# Patient Record
Sex: MALE | Race: BLACK OR AFRICAN AMERICAN | URBAN - METROPOLITAN AREA
[De-identification: names, ages, dates, MRNs, and addresses within clinical notes are randomized per-mention and may not be internally consistent; named-entity substitution may affect disease eponyms.]

---
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
## 2024-01-03 ENCOUNTER — APPOINTMENT (OUTPATIENT)
Dept: PRIMARY CARE | Facility: CLINIC | Age: 19
End: 2024-01-03
Payer: COMMERCIAL

## 2024-01-10 ENCOUNTER — OFFICE VISIT (OUTPATIENT)
Dept: PRIMARY CARE | Facility: CLINIC | Age: 19
End: 2024-01-10
Payer: COMMERCIAL

## 2024-01-10 VITALS
DIASTOLIC BLOOD PRESSURE: 76 MMHG | WEIGHT: 162 LBS | HEIGHT: 73 IN | BODY MASS INDEX: 21.47 KG/M2 | SYSTOLIC BLOOD PRESSURE: 117 MMHG | HEART RATE: 91 BPM

## 2024-01-10 DIAGNOSIS — G89.29 CHRONIC PAIN OF RIGHT KNEE: ICD-10-CM

## 2024-01-10 DIAGNOSIS — Z00.00 ANNUAL PHYSICAL EXAM: Primary | ICD-10-CM

## 2024-01-10 DIAGNOSIS — R11.15 EMESIS, PERSISTENT: ICD-10-CM

## 2024-01-10 DIAGNOSIS — M25.561 CHRONIC PAIN OF RIGHT KNEE: ICD-10-CM

## 2024-01-10 LAB
25(OH)D3 SERPL-MCNC: 19 NG/ML (ref 30–100)
ALBUMIN SERPL BCP-MCNC: 4.9 G/DL (ref 3.4–5)
ALP SERPL-CCNC: 59 U/L (ref 33–120)
ALT SERPL W P-5'-P-CCNC: 12 U/L (ref 10–52)
ANION GAP SERPL CALC-SCNC: 12 MMOL/L (ref 10–20)
AST SERPL W P-5'-P-CCNC: 16 U/L (ref 9–39)
BASOPHILS # BLD AUTO: 0.02 X10*3/UL (ref 0–0.1)
BASOPHILS NFR BLD AUTO: 0.8 %
BILIRUB SERPL-MCNC: 0.5 MG/DL (ref 0–1.2)
BUN SERPL-MCNC: 17 MG/DL (ref 6–23)
CALCIUM SERPL-MCNC: 9.8 MG/DL (ref 8.6–10.6)
CHLORIDE SERPL-SCNC: 106 MMOL/L (ref 98–107)
CO2 SERPL-SCNC: 28 MMOL/L (ref 21–32)
CREAT SERPL-MCNC: 1.02 MG/DL (ref 0.5–1.3)
EGFRCR SERPLBLD CKD-EPI 2021: >90 ML/MIN/1.73M*2
EOSINOPHIL # BLD AUTO: 0.02 X10*3/UL (ref 0–0.7)
EOSINOPHIL NFR BLD AUTO: 0.8 %
ERYTHROCYTE [DISTWIDTH] IN BLOOD BY AUTOMATED COUNT: 15.6 % (ref 11.5–14.5)
GLUCOSE SERPL-MCNC: 84 MG/DL (ref 74–99)
HCT VFR BLD AUTO: 41 % (ref 41–52)
HGB BLD-MCNC: 13.2 G/DL (ref 13.5–17.5)
IMM GRANULOCYTES # BLD AUTO: 0 X10*3/UL (ref 0–0.7)
IMM GRANULOCYTES NFR BLD AUTO: 0 % (ref 0–0.9)
LYMPHOCYTES # BLD AUTO: 1.01 X10*3/UL (ref 1.2–4.8)
LYMPHOCYTES NFR BLD AUTO: 40.7 %
MCH RBC QN AUTO: 28.4 PG (ref 26–34)
MCHC RBC AUTO-ENTMCNC: 32.2 G/DL (ref 32–36)
MCV RBC AUTO: 88 FL (ref 80–100)
MONOCYTES # BLD AUTO: 0.24 X10*3/UL (ref 0.1–1)
MONOCYTES NFR BLD AUTO: 9.7 %
NEUTROPHILS # BLD AUTO: 1.19 X10*3/UL (ref 1.2–7.7)
NEUTROPHILS NFR BLD AUTO: 48 %
NRBC BLD-RTO: 0 /100 WBCS (ref 0–0)
PLATELET # BLD AUTO: 224 X10*3/UL (ref 150–450)
POTASSIUM SERPL-SCNC: 4.2 MMOL/L (ref 3.5–5.3)
PROT SERPL-MCNC: 7.4 G/DL (ref 6.4–8.2)
RBC # BLD AUTO: 4.64 X10*6/UL (ref 4.5–5.9)
SODIUM SERPL-SCNC: 142 MMOL/L (ref 136–145)
TSH SERPL-ACNC: 0.86 MIU/L (ref 0.44–3.98)
WBC # BLD AUTO: 2.5 X10*3/UL (ref 4.4–11.3)

## 2024-01-10 PROCEDURE — 99385 PREV VISIT NEW AGE 18-39: CPT | Performed by: NURSE PRACTITIONER

## 2024-01-10 PROCEDURE — 36415 COLL VENOUS BLD VENIPUNCTURE: CPT

## 2024-01-10 PROCEDURE — 80053 COMPREHEN METABOLIC PANEL: CPT

## 2024-01-10 PROCEDURE — 85025 COMPLETE CBC W/AUTO DIFF WBC: CPT

## 2024-01-10 PROCEDURE — 99203 OFFICE O/P NEW LOW 30 MIN: CPT | Performed by: NURSE PRACTITIONER

## 2024-01-10 PROCEDURE — 1036F TOBACCO NON-USER: CPT | Performed by: NURSE PRACTITIONER

## 2024-01-10 PROCEDURE — 82306 VITAMIN D 25 HYDROXY: CPT

## 2024-01-10 PROCEDURE — 84443 ASSAY THYROID STIM HORMONE: CPT

## 2024-01-10 RX ORDER — IBUPROFEN 800 MG/1
800 TABLET ORAL 3 TIMES DAILY PRN
Qty: 30 TABLET | Refills: 0 | Status: SHIPPED | OUTPATIENT
Start: 2024-01-10 | End: 2024-03-19 | Stop reason: SDUPTHER

## 2024-01-10 RX ORDER — FAMOTIDINE 20 MG/1
20 TABLET, FILM COATED ORAL DAILY
Qty: 30 TABLET | Refills: 1 | Status: SHIPPED | OUTPATIENT
Start: 2024-01-10 | End: 2024-03-19 | Stop reason: SDUPTHER

## 2024-01-10 ASSESSMENT — ENCOUNTER SYMPTOMS
OCCASIONAL FEELINGS OF UNSTEADINESS: 0
DEPRESSION: 0
LOSS OF SENSATION IN FEET: 0

## 2024-01-10 ASSESSMENT — PATIENT HEALTH QUESTIONNAIRE - PHQ9
1. LITTLE INTEREST OR PLEASURE IN DOING THINGS: NOT AT ALL
2. FEELING DOWN, DEPRESSED OR HOPELESS: NOT AT ALL
SUM OF ALL RESPONSES TO PHQ9 QUESTIONS 1 AND 2: 0

## 2024-01-10 NOTE — PROGRESS NOTES
"Reason for Visit: Annual Physical Exam    HPI: Randy is a 18-year-old male who presents to the office today, accompanied by his girlfriend, as a new patient, for physical exam.  He reported that he has been having right knee pain.  Stated knee pain has been \"off and on \"for the past couple of years.  He admitted that he did play many no past surgical sports throughout high school.  He stated that he did sustain multiple sprains including knee ankle and wrist while in high school.  He is not currently taking any over-the-counter pain medications.    Stated he doesn't eat much. Reported he vomits after eating or drinking liquids. Denied hemoptysis or acid reflux. No abdominal pain, diarrhea or constipation. Denied urinary symptoms.    No past surgical or medical history.  He is not currently on any scheduled medications.  No known drug allergies.    He lives with his girlfriend.  He does not have any children.  He works as a home health aide.  Reported that he has smoked marijuana in the past.  Does drink alcohol occasionally.    Maternal grandmother with history of breast and bone cancer.    Active Problem List  Patient Active Problem List   Diagnosis   (none) - all problems resolved or deleted       Comprehensive Medical/Surgical/Social/Family History  Past Medical History:   Diagnosis Date    Contusion of right knee 12/26/2023    Fever 08/15/2010    Formatting of this note might be different from the original. Fever with significant HA and recent cough- feeling much better this morning after LP,  IVF and CTX- Physical exam is nonfocal and labs are unrevealing.  Clinical picture most c/w viral illness.  If po intake is good today- consider dc later today or in the morning.    Other conditions influencing health status 08/11/2021    No significant past medical history     Past Surgical History:   Procedure Laterality Date    OTHER SURGICAL HISTORY  08/11/2021    No history of surgery     Social History     Social " "History Narrative    Not on file         Allergies and Medications  Patient has no known allergies.  Current Outpatient Medications on File Prior to Visit   Medication Sig Dispense Refill    [DISCONTINUED] famotidine (Pepcid) 20 mg tablet Take 1 tablet (20 mg) by mouth once daily for 7 days. 7 tablet 0     No current facility-administered medications on file prior to visit.     ROS otherwise negative aside from what was mentioned above in HPI.    Vitals  /76   Pulse 91   Ht 1.854 m (6' 1\")   Wt 73.5 kg (162 lb)   BMI 21.37 kg/m²   Body mass index is 21.37 kg/m².  Physical Exam  Gen: Alert, NAD  HEENT:  PERRLA, EOMI, conjunctiva and sclera normal in appearance. External auditory canals/TMs normal; Oral cavity and posterior pharynx without lesions/exudate  Neck:  Supple with FROM; No masses/nodes palpable; Thyroid nontender and without nodules; No ALO  Respiratory:  Lungs CTAB  Cardiovascular:  Heart RRR. No M/R/G. Peripheral pulses equal bilaterally  Abdomen:  Soft, nontender, BS present throughout; No R/G/R; No HSM or masses palpated  Extremities:  FROM all extremities; Muscle strength grossly normal with good tone  Neuro:  CN II-XII intact; Reflexes 2+/2+; Gross motor and sensory intact  Skin:  No suspicious lesions present  Breast: No masses, skin lesions or nipple discharges, no axillary lymphadenopathy    Assessment and Plan:  Problem List Items Addressed This Visit    None  Visit Diagnoses       Annual physical exam    -  Primary    Relevant Orders    CBC and Auto Differential    Comprehensive Metabolic Panel    TSH with reflex to Free T4 if abnormal    Vitamin D 25-Hydroxy,Total (for eval of Vitamin D levels)    Chronic pain of right knee        Relevant Medications    ibuprofen 800 mg tablet        1) Annual physical: blood work ordered.    2) Chronic pain of right knee: you were prescribed Ibuprofen. Recommend knee stretches and exercises along with heat/ice alternation when having " pain/discomfort.    3) Emesis: I offered you Ondansetron. You refused prescription. Recommend you restart Famotidine. Referral placed for gastroenterology.

## 2024-01-11 ENCOUNTER — TELEPHONE (OUTPATIENT)
Dept: PRIMARY CARE | Facility: CLINIC | Age: 19
End: 2024-01-11
Payer: COMMERCIAL

## 2024-01-11 DIAGNOSIS — E55.9 VITAMIN D DEFICIENCY: ICD-10-CM

## 2024-01-11 DIAGNOSIS — R11.15 EMESIS, PERSISTENT: ICD-10-CM

## 2024-01-11 DIAGNOSIS — R10.9 STOMACH PAIN: ICD-10-CM

## 2024-01-11 RX ORDER — ERGOCALCIFEROL 1.25 MG/1
50000 CAPSULE ORAL
Qty: 13 CAPSULE | Refills: 1 | Status: SHIPPED | OUTPATIENT
Start: 2024-01-11 | End: 2024-03-19 | Stop reason: SDUPTHER

## 2024-01-11 NOTE — TELEPHONE ENCOUNTER
----- Message from JORGE Cullen-CNP sent at 1/10/2024 11:22 PM EST -----  Jessie,    Can you have Randy come in office for h. Pylori breath test. Also, I want him to see gastroenterology for his vomiting.

## 2024-02-14 ENCOUNTER — APPOINTMENT (OUTPATIENT)
Dept: GASTROENTEROLOGY | Facility: CLINIC | Age: 19
End: 2024-02-14
Payer: COMMERCIAL

## 2024-03-19 ENCOUNTER — OFFICE VISIT (OUTPATIENT)
Dept: PRIMARY CARE | Facility: CLINIC | Age: 19
End: 2024-03-19
Payer: COMMERCIAL

## 2024-03-19 VITALS
BODY MASS INDEX: 22.8 KG/M2 | HEIGHT: 73 IN | DIASTOLIC BLOOD PRESSURE: 58 MMHG | HEART RATE: 68 BPM | WEIGHT: 172 LBS | SYSTOLIC BLOOD PRESSURE: 118 MMHG

## 2024-03-19 DIAGNOSIS — Z13.220 LIPID SCREENING: ICD-10-CM

## 2024-03-19 DIAGNOSIS — R11.15 EMESIS, PERSISTENT: ICD-10-CM

## 2024-03-19 DIAGNOSIS — K21.9 GASTROESOPHAGEAL REFLUX DISEASE WITHOUT ESOPHAGITIS: ICD-10-CM

## 2024-03-19 DIAGNOSIS — R10.13 EPIGASTRIC PAIN: ICD-10-CM

## 2024-03-19 DIAGNOSIS — M79.601 RIGHT ARM PAIN: Primary | ICD-10-CM

## 2024-03-19 DIAGNOSIS — E55.9 VITAMIN D DEFICIENCY: ICD-10-CM

## 2024-03-19 DIAGNOSIS — D72.819 LEUKOPENIA, UNSPECIFIED TYPE: ICD-10-CM

## 2024-03-19 DIAGNOSIS — F39 MOOD DISORDER (CMS-HCC): ICD-10-CM

## 2024-03-19 DIAGNOSIS — D64.9 LOW HEMOGLOBIN: ICD-10-CM

## 2024-03-19 PROCEDURE — 1036F TOBACCO NON-USER: CPT | Performed by: NURSE PRACTITIONER

## 2024-03-19 PROCEDURE — 83013 H PYLORI (C-13) BREATH: CPT

## 2024-03-19 PROCEDURE — 99214 OFFICE O/P EST MOD 30 MIN: CPT | Performed by: NURSE PRACTITIONER

## 2024-03-19 RX ORDER — ERGOCALCIFEROL 1.25 MG/1
50000 CAPSULE ORAL
Qty: 13 CAPSULE | Refills: 0 | Status: SHIPPED | OUTPATIENT
Start: 2024-03-19 | End: 2024-09-17

## 2024-03-19 RX ORDER — FAMOTIDINE 20 MG/1
20 TABLET, FILM COATED ORAL DAILY
Qty: 90 TABLET | Refills: 1 | Status: SHIPPED | OUTPATIENT
Start: 2024-03-19

## 2024-03-19 RX ORDER — IBUPROFEN 800 MG/1
800 TABLET ORAL 3 TIMES DAILY PRN
Qty: 30 TABLET | Refills: 0 | Status: SHIPPED | OUTPATIENT
Start: 2024-03-19

## 2024-03-19 ASSESSMENT — ENCOUNTER SYMPTOMS
ABDOMINAL PAIN: 1
OCCASIONAL FEELINGS OF UNSTEADINESS: 0
DEPRESSION: 0
LOSS OF SENSATION IN FEET: 0

## 2024-03-19 ASSESSMENT — PATIENT HEALTH QUESTIONNAIRE - PHQ9
1. LITTLE INTEREST OR PLEASURE IN DOING THINGS: NOT AT ALL
SUM OF ALL RESPONSES TO PHQ9 QUESTIONS 1 AND 2: 0
2. FEELING DOWN, DEPRESSED OR HOPELESS: NOT AT ALL

## 2024-03-19 NOTE — PROGRESS NOTES
"Subjective   Patient ID: Randy Marrero is a 18 y.o. male who presents for Arm Pain and Abdominal Pain.    Randy presents to the office today, accompanied by his girlfriend, with concerns of right arm pain and abdominal pain. Stated he was lifting carton of soda and started feeling pain in right arm shortly after. Stated arm pain \"comes and goes\".     Has been having abdominal pain; particularly after eating. Stated pain is improved with Famotidine. Needs refill on Famotidine.    Girlfriend stated that Randy needs to see counselor. Stated he has outbursts in public and gets anxious. Admitted he has a family history of bipolar disorder (father and paternal grandmother).     Arm Pain   The incident occurred 5 to 7 days ago. The pain is present in the right shoulder and upper right arm. The pain radiates to the right hand. The pain has been Intermittent since the incident. He has tried nothing for the symptoms.   Abdominal Pain  This is a recurrent problem. The current episode started more than 1 month ago. The problem occurs every several days. The pain is located in the epigastric region. The pain is moderate. The quality of the pain is described as burning, cramping and dull.        Review of Systems   Gastrointestinal:  Positive for abdominal pain.   All other systems reviewed and are negative.      Objective   /58   Pulse 68   Ht 1.854 m (6' 1\")   Wt 78 kg (172 lb)   BMI 22.69 kg/m²     Physical Exam  Constitutional:       Appearance: Normal appearance.   HENT:      Head: Normocephalic and atraumatic.      Right Ear: Tympanic membrane, ear canal and external ear normal.      Left Ear: Tympanic membrane, ear canal and external ear normal.      Nose: Nose normal.      Mouth/Throat:      Mouth: Mucous membranes are moist.      Pharynx: Oropharynx is clear.   Eyes:      Extraocular Movements: Extraocular movements intact.      Conjunctiva/sclera: Conjunctivae normal.      Pupils: Pupils are equal, round, " and reactive to light.   Cardiovascular:      Rate and Rhythm: Normal rate and regular rhythm.   Pulmonary:      Effort: Pulmonary effort is normal.      Breath sounds: Normal breath sounds.   Abdominal:      General: Abdomen is flat. Bowel sounds are normal. There is no distension.      Palpations: Abdomen is soft. There is no mass.      Tenderness: There is no guarding.   Musculoskeletal:         General: Normal range of motion.      Cervical back: Normal range of motion.   Skin:     General: Skin is warm and dry.   Neurological:      General: No focal deficit present.      Mental Status: He is alert and oriented to person, place, and time. Mental status is at baseline.   Psychiatric:         Mood and Affect: Mood normal.         Behavior: Behavior normal.         Thought Content: Thought content normal.         Judgment: Judgment normal.         Assessment/Plan   Problem List Items Addressed This Visit    None  Visit Diagnoses         Codes    Right arm pain    -  Primary M79.601    Relevant Medications    ibuprofen 800 mg tablet    Vitamin D deficiency     E55.9    Relevant Medications    ergocalciferol (Vitamin D-2) 1.25 MG (31658 UT) capsule    Emesis, persistent     R11.15    Relevant Medications    famotidine (Pepcid) 20 mg tablet    Lipid screening     Z13.220    Relevant Orders    Lipid Panel    Leukopenia, unspecified type     D72.819    Relevant Orders    CBC    Epigastric pain     R10.13    Relevant Orders    H. Pylori Breath Test    Gastroesophageal reflux disease without esophagitis     K21.9    Relevant Orders    H. Pylori Breath Test          1) Right arm pain: recommend exercises to right upper extremity/right shoulder along with heat/ice alternation. May take Ibuprofen as needed for arm pain.     2) Abdominal pain/nausea: h. Pylori breath test ordered. Famotidine refilled. counseled on foods/liquids that may aggravate GERD (spicy, citrus, tomatoes, fatty foods, chocolate, mint, tea, caffeine and  alcohol). Avoid lying down for 2 hours after eating and avoid constricting clothing. If no improvement, will order ultrasound of abdomen.    3) Leukopenia: CBC ordered.     4) Mood disorder: you were given names and telephone numbers for psychiatry.

## 2024-03-20 ENCOUNTER — LAB (OUTPATIENT)
Dept: LAB | Facility: LAB | Age: 19
End: 2024-03-20
Payer: COMMERCIAL

## 2024-03-20 DIAGNOSIS — D72.819 LEUKOPENIA, UNSPECIFIED TYPE: ICD-10-CM

## 2024-03-20 DIAGNOSIS — Z13.220 LIPID SCREENING: ICD-10-CM

## 2024-03-20 LAB
CHOLEST SERPL-MCNC: 183 MG/DL (ref 0–199)
CHOLESTEROL/HDL RATIO: 3.4
ERYTHROCYTE [DISTWIDTH] IN BLOOD BY AUTOMATED COUNT: 15 % (ref 11.5–14.5)
HCT VFR BLD AUTO: 38.3 % (ref 41–52)
HDLC SERPL-MCNC: 53.9 MG/DL
HGB BLD-MCNC: 13 G/DL (ref 13.5–17.5)
LDLC SERPL CALC-MCNC: 118 MG/DL
MCH RBC QN AUTO: 28.8 PG (ref 26–34)
MCHC RBC AUTO-ENTMCNC: 33.9 G/DL (ref 32–36)
MCV RBC AUTO: 85 FL (ref 80–100)
NON HDL CHOLESTEROL: 129 MG/DL (ref 0–119)
NRBC BLD-RTO: 0 /100 WBCS (ref 0–0)
PLATELET # BLD AUTO: 212 X10*3/UL (ref 150–450)
RBC # BLD AUTO: 4.51 X10*6/UL (ref 4.5–5.9)
TRIGL SERPL-MCNC: 57 MG/DL (ref 0–149)
UREA BREATH TEST QL: NEGATIVE
VLDL: 11 MG/DL (ref 0–40)
WBC # BLD AUTO: 4.3 X10*3/UL (ref 4.4–11.3)

## 2024-03-20 PROCEDURE — 82728 ASSAY OF FERRITIN: CPT

## 2024-03-20 PROCEDURE — 85027 COMPLETE CBC AUTOMATED: CPT

## 2024-03-20 PROCEDURE — 83550 IRON BINDING TEST: CPT

## 2024-03-20 PROCEDURE — 83540 ASSAY OF IRON: CPT

## 2024-03-20 PROCEDURE — 80061 LIPID PANEL: CPT

## 2024-03-20 PROCEDURE — 36415 COLL VENOUS BLD VENIPUNCTURE: CPT

## 2024-03-21 LAB
FERRITIN SERPL-MCNC: 129 NG/ML (ref 20–300)
IRON SATN MFR SERPL: 28 % (ref 25–45)
IRON SERPL-MCNC: 107 UG/DL (ref 35–150)
TIBC SERPL-MCNC: 381 UG/DL (ref 240–445)
UIBC SERPL-MCNC: 274 UG/DL (ref 110–370)